# Patient Record
(demographics unavailable — no encounter records)

---

## 2021-12-13 NOTE — NUR
UPON RN ARRIVING TO THE FLOOR, PT WAS IN THE OR. RN RECEIVED REPORT ON THIS PT
HOWEVER, WAS UNABLE TO ASSESS PT AT THIS TIME.

## 2021-12-15 NOTE — NUR
PATIENT HAS BLOATED ABDOMEN AND EXPRESSES THAT HE HAS QUITE A BIT OF PAIN.
PROVIDER CALLED AND NURSE MADE A SUGGESTIOON OF SYMETHICONE OR TUMS. PATIENT
HAS HAD NO GAS COLLECT IN CHOLOSTOMY. MD SUGGESTED 30ML MYLANTA FOR HEART
BURN, BUT RECOMMENDED PATIENT USE LESS PAIN MEDS AND GET MORE MOVEMENT TO MOVE
GAS. THIS WAS EXPLAINED TO PATIENT. PATIENT REQUESTED PAIN MEDS ANYWAY AND
SAID HE WOULD TRY TO WALK AFTER HE GOT MORE PAIN MEDS. WILL CONTINUE TO
MONITOR.

## 2021-12-15 NOTE — NUR
PATIENT STILL HAVING QUITE A BIT OF PAIN AND NAUSEA. STATES HE THREW UP.
CALLED DR. MORRIS GOT NO ANSWER. LEFT MESSAGE. WILL CONTINUE TO MONITOR.